# Patient Record
Sex: FEMALE | Race: BLACK OR AFRICAN AMERICAN | NOT HISPANIC OR LATINO | Employment: UNEMPLOYED | URBAN - METROPOLITAN AREA
[De-identification: names, ages, dates, MRNs, and addresses within clinical notes are randomized per-mention and may not be internally consistent; named-entity substitution may affect disease eponyms.]

---
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
## 2023-11-06 ENCOUNTER — OFFICE VISIT (OUTPATIENT)
Dept: URGENT CARE | Facility: URGENT CARE | Age: 23
End: 2023-11-06
Payer: COMMERCIAL

## 2023-11-06 VITALS
HEART RATE: 95 BPM | TEMPERATURE: 99.2 F | DIASTOLIC BLOOD PRESSURE: 81 MMHG | SYSTOLIC BLOOD PRESSURE: 120 MMHG | BODY MASS INDEX: 30.39 KG/M2 | WEIGHT: 182.6 LBS | OXYGEN SATURATION: 98 %

## 2023-11-06 DIAGNOSIS — J02.9 SORE THROAT: Primary | ICD-10-CM

## 2023-11-06 LAB
DEPRECATED S PYO AG THROAT QL EIA: NEGATIVE
GROUP A STREP BY PCR: NOT DETECTED

## 2023-11-06 PROCEDURE — 99203 OFFICE O/P NEW LOW 30 MIN: CPT

## 2023-11-06 PROCEDURE — 87651 STREP A DNA AMP PROBE: CPT

## 2023-11-06 NOTE — PATIENT INSTRUCTIONS
Strep test is negative for strep throat.  Get plenty of rest and drink fluids.  Can use Tylenol and/or ibuprofen as needed for pain and fever.  Maximum dose of Tylenol is 4000mg in a 24 hour period of time.  Take ibuprofen with food to avoid stomach upset.  You can also try warm salt water gargles, hot/warm water or tea with honey and/or lemon and/or Cepacol lozenges or spray for your sore throat.

## 2023-11-06 NOTE — PROGRESS NOTES
ASSESSMENT:   (J02.9) Sore throat  (primary encounter diagnosis)  Plan: Streptococcus A Rapid Screen w/Reflex to PCR,         Group A Streptococcus PCR Throat Swab    PLAN:  Informed the patient that the strep test is negative for strep throat. We discussed the need to get plenty of rest, drink fluids and use Tylenol and or ibuprofen as needed for pain and fever with a maximum dose of Tylenol being 4000 mg in a 24-hour period of time and to take ibuprofen with food to avoid upset stomach.  We also discussed trying warm salt water gargles, hot/warm water or tea with honey and/or lemon and/or Cepacol lozenges or spray for the sore throat.  Work note provided.  Discussed the need to return to clinic with any new or worsening symptoms.  Patient acknowledged their understanding of the above plan.    The use of Dragon/Unmetrication services may have been used to construct the content in this note; any grammatical or spelling errors are non-intentional. Please contact the author of this note directly if you are in need of any clarification.      Ambrose Pink, ROB CNP      SUBJECTIVE:   Zahida Cooper  is a 22 year old female who is here today because of: Sore Throat.  The patient has had symptoms of fever, cough, and nasal congestion/runny nose.   Onset of symptoms was 1 week ago. Course of illness is improving.  Patient denies exposure to illness at home or work.   Patient denies earache, vomiting, and diarrhea  Treatment measures tried include Sudafed, Nyquil, Dayquil.    At home COVID test negative.      ROS:  Negative except noted above.      OBJECTIVE:   /81   Pulse 95   Temp 99.2  F (37.3  C) (Tympanic)   Wt 82.8 kg (182 lb 9.6 oz)   SpO2 98%   BMI 30.39 kg/m    General: healthy, alert and no distress  Eyes - conjunctivae clear.  Nose/Sinuses - Nares normal.Mucosa normal. No drainage or sinus tenderness.  Oropharynx - Lips, mucosa, tonsils, oropharynx and tongue normal.  Neck - Neck  supple; no lymphadenopathy  Lungs - Lungs clear; no wheezing or rales.  Heart - regular rate and rhythm. No murmurs, rub.    Labs:  Rapid Strep test is negative; await throat culture results.

## 2023-11-06 NOTE — LETTER
November 6, 2023      Zahida Cooper  [address]   [place] [address]        To Whom It May Concern:    Zahida Cooper  was seen on November 6, 2023.  Please excuse her from work until November 8th due to illness.        Sincerely,        ROB Saravia CNP     [FreeTextEntry2] : ear

## 2023-11-25 ENCOUNTER — HEALTH MAINTENANCE LETTER (OUTPATIENT)
Age: 23
End: 2023-11-25